# Patient Record
Sex: MALE | Race: WHITE | NOT HISPANIC OR LATINO | URBAN - METROPOLITAN AREA
[De-identification: names, ages, dates, MRNs, and addresses within clinical notes are randomized per-mention and may not be internally consistent; named-entity substitution may affect disease eponyms.]

---

## 2017-07-24 ENCOUNTER — OUTPATIENT (OUTPATIENT)
Dept: OUTPATIENT SERVICES | Facility: HOSPITAL | Age: 47
LOS: 1 days | End: 2017-07-24
Payer: MEDICAID

## 2017-07-24 PROCEDURE — 93880 EXTRACRANIAL BILAT STUDY: CPT | Mod: 26

## 2017-08-01 ENCOUNTER — OUTPATIENT (OUTPATIENT)
Dept: OUTPATIENT SERVICES | Facility: HOSPITAL | Age: 47
LOS: 1 days | End: 2017-08-01
Payer: MEDICAID

## 2017-08-03 DIAGNOSIS — R69 ILLNESS, UNSPECIFIED: ICD-10-CM

## 2017-10-01 PROCEDURE — G9001: CPT

## 2018-11-01 ENCOUNTER — OUTPATIENT (OUTPATIENT)
Dept: OUTPATIENT SERVICES | Facility: HOSPITAL | Age: 48
LOS: 1 days | End: 2018-11-01
Payer: MEDICAID

## 2018-11-01 PROCEDURE — G9001: CPT

## 2018-11-06 DIAGNOSIS — Z71.89 OTHER SPECIFIED COUNSELING: ICD-10-CM

## 2022-12-13 ENCOUNTER — NON-APPOINTMENT (OUTPATIENT)
Age: 52
End: 2022-12-13

## 2022-12-13 PROBLEM — Z00.00 ENCOUNTER FOR PREVENTIVE HEALTH EXAMINATION: Status: ACTIVE | Noted: 2022-12-13

## 2022-12-14 ENCOUNTER — FORM ENCOUNTER (OUTPATIENT)
Age: 52
End: 2022-12-14

## 2022-12-15 ENCOUNTER — APPOINTMENT (OUTPATIENT)
Dept: CARE COORDINATION | Facility: HOME HEALTH | Age: 52
End: 2022-12-15

## 2022-12-15 DIAGNOSIS — Z87.891 PERSONAL HISTORY OF NICOTINE DEPENDENCE: ICD-10-CM

## 2022-12-15 DIAGNOSIS — Z86.19 PERSONAL HISTORY OF OTHER INFECTIOUS AND PARASITIC DISEASES: ICD-10-CM

## 2022-12-15 DIAGNOSIS — I50.20 UNSPECIFIED SYSTOLIC (CONGESTIVE) HEART FAILURE: ICD-10-CM

## 2022-12-15 DIAGNOSIS — I50.9 HEART FAILURE, UNSPECIFIED: ICD-10-CM

## 2022-12-15 DIAGNOSIS — Z99.89 OBSTRUCTIVE SLEEP APNEA (ADULT) (PEDIATRIC): ICD-10-CM

## 2022-12-15 DIAGNOSIS — Z82.49 FAMILY HISTORY OF ISCHEMIC HEART DISEASE AND OTHER DISEASES OF THE CIRCULATORY SYSTEM: ICD-10-CM

## 2022-12-15 DIAGNOSIS — G47.33 OBSTRUCTIVE SLEEP APNEA (ADULT) (PEDIATRIC): ICD-10-CM

## 2022-12-15 DIAGNOSIS — Z78.9 OTHER SPECIFIED HEALTH STATUS: ICD-10-CM

## 2022-12-15 DIAGNOSIS — B33.8 OTHER SPECIFIED VIRAL DISEASES: ICD-10-CM

## 2022-12-15 DIAGNOSIS — N18.6 END STAGE RENAL DISEASE: ICD-10-CM

## 2022-12-15 DIAGNOSIS — Z99.2 END STAGE RENAL DISEASE: ICD-10-CM

## 2022-12-15 DIAGNOSIS — Z80.9 FAMILY HISTORY OF MALIGNANT NEOPLASM, UNSPECIFIED: ICD-10-CM

## 2022-12-15 PROCEDURE — 99348 HOME/RES VST EST LOW MDM 30: CPT | Mod: 95

## 2022-12-21 PROBLEM — Z87.891 FORMER SMOKER: Status: ACTIVE | Noted: 2022-12-21

## 2022-12-21 PROBLEM — B33.8 RSV (RESPIRATORY SYNCYTIAL VIRUS INFECTION): Status: ACTIVE | Noted: 2022-12-21

## 2022-12-21 PROBLEM — Z86.19 HISTORY OF RESPIRATORY SYNCYTIAL VIRUS (RSV) INFECTION: Status: RESOLVED | Noted: 2022-12-21 | Resolved: 2022-12-21

## 2022-12-21 PROBLEM — Z82.49 FAMILY HISTORY OF CORONARY ARTERY DISEASE: Status: ACTIVE | Noted: 2022-12-21

## 2022-12-21 PROBLEM — G47.33 OSA ON CPAP: Status: RESOLVED | Noted: 2022-12-21 | Resolved: 2022-12-21

## 2022-12-21 PROBLEM — Z82.49 FAMILY HISTORY OF CONGESTIVE HEART FAILURE: Status: ACTIVE | Noted: 2022-12-21

## 2022-12-21 PROBLEM — Z78.9 DENIES ALCOHOL CONSUMPTION: Status: ACTIVE | Noted: 2022-12-21

## 2022-12-21 PROBLEM — I50.9 CHF (CONGESTIVE HEART FAILURE): Status: ACTIVE | Noted: 2022-12-21

## 2022-12-21 PROBLEM — I50.20 SYSTOLIC CHF: Status: RESOLVED | Noted: 2022-12-21 | Resolved: 2022-12-21

## 2022-12-21 PROBLEM — Z78.9 DOES NOT USE ILLICIT DRUGS: Status: ACTIVE | Noted: 2022-12-21

## 2022-12-21 PROBLEM — Z80.9 FAMILY HISTORY OF MALIGNANT NEOPLASM: Status: ACTIVE | Noted: 2022-12-21

## 2022-12-21 PROBLEM — N18.6 ESRD (END STAGE RENAL DISEASE) ON DIALYSIS: Status: RESOLVED | Noted: 2022-12-21 | Resolved: 2022-12-21

## 2022-12-21 RX ORDER — ASPIRIN 81 MG/1
81 TABLET, COATED ORAL
Refills: 0 | Status: ACTIVE | COMMUNITY

## 2022-12-21 RX ORDER — SACUBITRIL AND VALSARTAN 24; 26 MG/1; MG/1
24-26 TABLET, FILM COATED ORAL
Refills: 0 | Status: ACTIVE | COMMUNITY

## 2022-12-21 RX ORDER — CALCIUM ACETATE 667 MG/1
667 TABLET ORAL
Refills: 0 | Status: ACTIVE | COMMUNITY

## 2022-12-21 RX ORDER — TORSEMIDE 100 MG/1
100 TABLET ORAL
Refills: 0 | Status: ACTIVE | COMMUNITY

## 2022-12-21 NOTE — PHYSICAL EXAM
[No Acute Distress] : no acute distress [Well Nourished] : well nourished [Normal Sclera/Conjunctiva] : normal sclera/conjunctiva [Normal Outer Ear/Nose] : the outer ears and nose were normal in appearance [No JVD] : no jugular venous distention [No Respiratory Distress] : no respiratory distress  [No Accessory Muscle Use] : no accessory muscle use [Non Tender] : non-tender [de-identified] : Limited Visual Physical Exam during TeleHealth Visit [de-identified] : Awake and alert [de-identified] : Calm

## 2022-12-21 NOTE — HISTORY OF PRESENT ILLNESS
[Home] : at home, [unfilled] , at the time of the visit. [Other Location: e.g. Home (Enter Location, City,State)___] : at [unfilled] [Other:____] : [unfilled] [Verbal consent obtained from patient] : the patient, [unfilled] [FreeTextEntry1] : Follow up post discharge\par  [de-identified] : This patient is Enrolled in the Post-Discharge [facility] Follow Up Program through [facility]\par Copied As per [facility] Discharge Summary\par \par \par "52 year old male with history of ZI, ESRD on HD (M-W-F), HFr[place] [facility] [place] found to have +RSV in the setting of CHF and ESRD on HD.  Pt was treated with Solu-Medrol transitioned to Prednisone, IV diuretics transitioned to oral Torsemide followed by cardiology and was discharge home with home care services and follow up care."\par \par During the TeleHealth the patient was in no acute distress.  Able to speak with complete sentences and no audible wheeze noted.\par The patient denies chest pain, shortness of breath, cough, hemoptysis, fever, palpitations, syncope. Pt had HD 12/14/2022 with chronic LE edema.  Pt will follow up with his nephrologist Dr. Brown\par \par CN reviewed that pt  is under the [facility] program for home care until pt is seen by  PCP.   CN will be assigned to sign Home Care orders post-discharge.\par \par

## 2022-12-21 NOTE — ASSESSMENT
[FreeTextEntry1] : "52 year old male with history of ZI, ESRD on HD (M-W-F), HFrEF [place] [facility] [place] SOB found to have +RSV in the setting of CHF and ESRD on HD.  Pt was treated with Solu-Medrol transitioned to Prednisone, IV diuretics transitioned to oral Torsemide followed by cardiology and was discharge home with home care services and follow up care."\par \par + RSV, ZI, HFrEF: stable\par Continue established treatment plan with follow up\par Continue Entresto and Torsemide\par Maintain low salt diet, 1.5 fluid restriction\par Check daily weight and maintain log\par Monitor for worsening  signs and symptoms  and reviewed when to call medical provider\par Pt verbalized good understanding\par Follow up with PCP and Cardiology\par \par ESRD on HD: stable\par Continue established treatment plan with follow up\par Continue HD as per renal\par Monitor for worsening  signs and symptoms  and reviewed when to call medical provider\par Pt verbalized good understanding\par Follow up with Medical providers: PCP, Nephrology\par \par Pt will need continued Home Care Services RN\par This patient is Enrolled in the [facility] Follow Up Program through [employer]\par Recommended  referral to assist with PCP follow up appointment\par CN reviewed with the pt that pt is under the [facility] program for home care until pt is seen by the PCP.   CN will be assigned to sign Home Care orders post-discharge.\par \par

## 2022-12-25 ENCOUNTER — FORM ENCOUNTER (OUTPATIENT)
Age: 52
End: 2022-12-25

## 2023-01-03 ENCOUNTER — FORM ENCOUNTER (OUTPATIENT)
Age: 53
End: 2023-01-03